# Patient Record
Sex: MALE | Race: WHITE | Employment: OTHER | URBAN - METROPOLITAN AREA
[De-identification: names, ages, dates, MRNs, and addresses within clinical notes are randomized per-mention and may not be internally consistent; named-entity substitution may affect disease eponyms.]

---

## 2024-04-29 ENCOUNTER — OFFICE VISIT (OUTPATIENT)
Age: 44
End: 2024-04-29
Payer: COMMERCIAL

## 2024-04-29 VITALS
WEIGHT: 175 LBS | DIASTOLIC BLOOD PRESSURE: 80 MMHG | SYSTOLIC BLOOD PRESSURE: 130 MMHG | HEIGHT: 70 IN | HEART RATE: 70 BPM | BODY MASS INDEX: 25.05 KG/M2

## 2024-04-29 DIAGNOSIS — Z80.42 FAMILY HISTORY OF PROSTATE CANCER: ICD-10-CM

## 2024-04-29 DIAGNOSIS — R35.0 FREQUENCY OF MICTURITION: ICD-10-CM

## 2024-04-29 DIAGNOSIS — N13.8 BPH WITH OBSTRUCTION/LOWER URINARY TRACT SYMPTOMS: Primary | ICD-10-CM

## 2024-04-29 DIAGNOSIS — N40.1 BPH WITH OBSTRUCTION/LOWER URINARY TRACT SYMPTOMS: Primary | ICD-10-CM

## 2024-04-29 LAB
BILIRUBIN, POC: NORMAL
BLOOD URINE, POC: NORMAL
CLARITY, POC: CLEAR
COLOR, POC: YELLOW
GLUCOSE URINE, POC: NORMAL
KETONES, POC: NORMAL
LEUKOCYTE EST, POC: NORMAL
NITRITE, POC: NORMAL
PH, POC: NORMAL
PROTEIN, POC: NORMAL
SPECIFIC GRAVITY, POC: NORMAL
UROBILINOGEN, POC: NORMAL

## 2024-04-29 PROCEDURE — 81003 URINALYSIS AUTO W/O SCOPE: CPT | Performed by: SPECIALIST

## 2024-04-29 PROCEDURE — 99203 OFFICE O/P NEW LOW 30 MIN: CPT | Performed by: SPECIALIST

## 2024-04-29 NOTE — PROGRESS NOTES
Iglesia Vicente MD [facility] Urology Consultation / New Patient Visit    Patient:  Pedro Pérez  YOB: 1980  Date: 4/29/2024    HISTORY OF PRESENT ILLNESS:   The patient is a 43 y.o. male who presents today for evaluation of the following problems:   Prostate Check  The patient presents today for a prostate check and SEN.  His last Digital Rectal Exam was 1 year(s) ago.   Lower urinary tract symptoms: frequency and decreased urinary stream  Recently the urinary symptoms are: show no change  Current medical Rx for BPH/LUTS: none   Lower urinary tract symptoms: urgency, frequency, decreased urinary stream, and nocturia, 1 times per night   Today's AUA Symptom Score (QOL): 7 (2)  (Patient's old records have been requested, reviewed and summarized in today's note: office note of Yulisa Posey MD)    Summary of old records:   BPH, mild  Frequency due to coffee intake  FmHx prostate cancer in GF    Procedures Today: none    Urinalysis today:  Results for POC orders placed in visit on 04/29/24   POCT Urinalysis No Micro (Auto)   Result Value Ref Range    Color, UA yellow     Clarity, UA clear     Glucose, UA POC neg     Bilirubin, UA      Ketones, UA      Spec Grav, UA      Blood, UA POC neg     pH, UA      Protein, UA POC neg     Urobilinogen, UA      Leukocytes, UA neg     Nitrite, UA neg        Last several PSA's:  No results found for: \"PSA\"    Last total testosterone:  No results found for: \"TESTOSTERONE\"    Last BUN and creatinine:  No results found for: \"BUN\"  No results found for: \"CREATININE\"    Last CBC:  No results found for: \"WBC\", \"HGB\", \"HCT\", \"MCV\", \"PLT\"    Additional Lab/Culture results: none    Imaging Reviewed during this Office Visit: 5/28/21 CT abdomen and pelvis without contrast   There are no calcifications seen in either kidney, there is no renal swelling perinephric stranding or hydronephrosis and there are no stones seen within or along the course of the

## 2024-09-05 ENCOUNTER — TELEPHONE (OUTPATIENT)
Dept: GASTROENTEROLOGY | Age: 44
End: 2024-09-05

## 2024-09-05 NOTE — TELEPHONE ENCOUNTER
np-Gastrointestinal hemorrhage, unspecified   attempt 1- no answer left v/m   attempt 2- sent new letter

## 2024-09-26 ENCOUNTER — TELEPHONE (OUTPATIENT)
Dept: GASTROENTEROLOGY | Age: 44
End: 2024-09-26

## 2024-10-13 NOTE — PROGRESS NOTES
Reason for Referral:     Yulisa Posey MD  [address] [place] [address]    Chief Complaint   Patient presents with    New Patient     S/s of gastrointestinal hemorrhage        1. Positive FIT (fecal immunochemical test)        HISTORY OF PRESENT ILLNESS: Mr.Nathan Pérez is a 44 y.o. male with a past history remarkable for tobacco use, alcohol use, referred as new consultation for evaluation of positive FIT test    [facility] and he comes for scope. He denies any GI issues like abd pain, constipation, diarrhea, weight change, or blood in stool.     FMH  No stomach or colon cancer in family    Previous Endoscopies  none    Previous GI workup     Patient's PMH/PSH,SH,PSYCH Hx, MEDs, ALLERGIES, and ROS were all reviewed and updated in the appropriate sections.    PAST MEDICAL HISTORY:  Past Medical History:   Diagnosis Date    Acute hemorrhoid     Back pain     Raynaud's syndrome        Past Surgical History:   Procedure Laterality Date    INGUINAL HERNIA REPAIR      VASECTOMY         CURRENT MEDICATIONS:  No current outpatient medications on file.    ALLERGIES:   No Known Allergies    FAMILY HISTORY:   No family history on file.    SOCIAL HISTORY:   Social History     Socioeconomic History    Marital status:      Spouse name: Not on file    Number of children: Not on file    Years of education: Not on file    Highest education level: Not on file   Occupational History    Not on file   Tobacco Use    Smoking status: Never    Smokeless tobacco: Never   Vaping Use    Vaping status: Never Used   Substance and Sexual Activity    Alcohol use: Yes     Comment: social    Drug use: Never    Sexual activity: Not on file   Other Topics Concern    Not on file   Social History Narrative    Not on file     Social Determinants of Health     Financial Resource Strain: Not on file   Food Insecurity: Not on file   Transportation Needs: Not on file   Physical Activity: Not on

## 2024-10-14 ENCOUNTER — OFFICE VISIT (OUTPATIENT)
Dept: GASTROENTEROLOGY | Age: 44
End: 2024-10-14

## 2024-10-14 ENCOUNTER — PREP FOR PROCEDURE (OUTPATIENT)
Dept: GASTROENTEROLOGY | Age: 44
End: 2024-10-14

## 2024-10-14 VITALS
RESPIRATION RATE: 16 BRPM | HEART RATE: 71 BPM | SYSTOLIC BLOOD PRESSURE: 120 MMHG | WEIGHT: 177.6 LBS | HEIGHT: 70 IN | TEMPERATURE: 97.5 F | DIASTOLIC BLOOD PRESSURE: 79 MMHG | OXYGEN SATURATION: 99 % | BODY MASS INDEX: 25.43 KG/M2

## 2024-10-14 DIAGNOSIS — R19.5 POSITIVE FIT (FECAL IMMUNOCHEMICAL TEST): ICD-10-CM

## 2024-10-14 DIAGNOSIS — R19.5 POSITIVE FIT (FECAL IMMUNOCHEMICAL TEST): Primary | ICD-10-CM

## 2024-10-14 PROCEDURE — 99203 OFFICE O/P NEW LOW 30 MIN: CPT | Performed by: STUDENT IN AN ORGANIZED HEALTH CARE EDUCATION/TRAINING PROGRAM

## 2024-10-14 RX ORDER — SODIUM, POTASSIUM,MAG SULFATES 17.5-3.13G
SOLUTION, RECONSTITUTED, ORAL ORAL
Qty: 1 EACH | Refills: 0 | Status: SHIPPED | OUTPATIENT
Start: 2024-10-14

## 2024-10-14 NOTE — TELEPHONE ENCOUNTER
Procedure scheduled/Dr Ribera  Procedure:colon  Dx:positive fit  Date:01/15/2024  Time:1130 am   Hospital:[facility] phone call:tbd  Bowel Prep instructions given:suprep  In office/via phone:   Clearance needed:none